# Patient Record
Sex: FEMALE
[De-identification: names, ages, dates, MRNs, and addresses within clinical notes are randomized per-mention and may not be internally consistent; named-entity substitution may affect disease eponyms.]

---

## 2018-03-29 ENCOUNTER — HOSPITAL ENCOUNTER (OUTPATIENT)
Dept: HOSPITAL 61 - CCL | Age: 58
Discharge: HOME | End: 2018-03-29
Attending: INTERNAL MEDICINE
Payer: COMMERCIAL

## 2018-03-29 DIAGNOSIS — F17.210: ICD-10-CM

## 2018-03-29 DIAGNOSIS — Z88.6: ICD-10-CM

## 2018-03-29 DIAGNOSIS — Z83.3: ICD-10-CM

## 2018-03-29 DIAGNOSIS — Z79.01: ICD-10-CM

## 2018-03-29 DIAGNOSIS — Z79.4: ICD-10-CM

## 2018-03-29 DIAGNOSIS — M79.7: ICD-10-CM

## 2018-03-29 DIAGNOSIS — Z79.899: ICD-10-CM

## 2018-03-29 DIAGNOSIS — R94.39: Primary | ICD-10-CM

## 2018-03-29 DIAGNOSIS — E78.2: ICD-10-CM

## 2018-03-29 DIAGNOSIS — E11.9: ICD-10-CM

## 2018-03-29 DIAGNOSIS — Z90.710: ICD-10-CM

## 2018-03-29 DIAGNOSIS — Z72.89: ICD-10-CM

## 2018-03-29 DIAGNOSIS — J44.9: ICD-10-CM

## 2018-03-29 DIAGNOSIS — Z98.890: ICD-10-CM

## 2018-03-29 LAB
ADD MAN DIFF?: NO
ALBUMIN SERPL-MCNC: 3.9 G/DL (ref 3.4–5)
ALBUMIN/GLOB SERPL: 1 {RATIO} (ref 1–1.7)
ALP SERPL-CCNC: 90 U/L (ref 46–116)
ALT (SGPT): 51 U/L (ref 14–59)
ANION GAP SERPL CALC-SCNC: 11 MMOL/L (ref 6–14)
AST SERPL-CCNC: 34 U/L (ref 15–37)
BASO #: 0.1 X10^3/UL (ref 0–0.2)
BASO %: 1 % (ref 0–3)
BLOOD UREA NITROGEN: 15 MG/DL (ref 7–20)
BUN/CREAT SERPL: 19 (ref 6–20)
CALCIUM: 9.8 MG/DL (ref 8.5–10.1)
CHLORIDE: 106 MMOL/L (ref 98–107)
CHOLESTEROL/HDL RATIO: 2.8
CHOLESTEROL: 124 MG/DL (ref 0–200)
CO2 SERPL-SCNC: 25 MMOL/L (ref 21–32)
CREAT SERPL-MCNC: 0.8 MG/DL (ref 0.6–1)
EOS #: 0.3 X10^3/UL (ref 0–0.7)
EOS %: 4 % (ref 0–3)
GFR SERPLBLD BASED ON 1.73 SQ M-ARVRAT: 74.2 ML/MIN
GLOBULIN SER-MCNC: 3.8 G/DL (ref 2.2–3.8)
GLUCOSE SERPL-MCNC: 110 MG/DL (ref 70–99)
HCG SERPL-ACNC: 8 X10^3/UL (ref 4–11)
HDLC: 44 MG/DL (ref 40–60)
HEMATOCRIT: 44.4 % (ref 36–47)
HEMOGLOBIN: 15.3 G/DL (ref 12–15.5)
INR: 1.1 (ref 0.8–1.1)
LDLC: 56 MG/DL (ref 0–100)
LYMPH #: 2.4 X10^3/UL (ref 1–4.8)
LYMPH %: 30 % (ref 24–48)
MAGNESIUM: 1.8 MG/DL (ref 1.8–2.4)
MEAN CORPUSCULAR HEMOGLOBIN: 32 PG (ref 25–35)
MEAN CORPUSCULAR HGB CONC: 34 G/DL (ref 31–37)
MEAN CORPUSCULAR VOLUME: 94 FL (ref 79–100)
MONO #: 0.6 X10^3/UL (ref 0–1.1)
MONO %: 7 % (ref 0–9)
NEUT #: 4.7 X10^3UL (ref 1.8–7.7)
NEUT %: 59 % (ref 31–73)
NON-HDL CHOLESTEROL: 80 MG/DL (ref 0–129)
PARTIAL THROMBOPLASTIN TIME: 29 SEC (ref 24–38)
PLATELET COUNT: 184 X10^3/UL (ref 140–400)
POTASSIUM SERPL-SCNC: 4.4 MMOL/L (ref 3.5–5.1)
PROTHROMBIN TIME PATIENT: 13.9 SEC (ref 11.7–14)
RED BLOOD COUNT: 4.74 X10^6/UL (ref 3.5–5.4)
RED CELL DISTRIBUTION WIDTH: 13.5 % (ref 11.5–14.5)
SODIUM: 142 MMOL/L (ref 136–145)
TOTAL BILIRUBIN: 0.3 MG/DL (ref 0.2–1)
TOTAL PROTEIN: 7.7 G/DL (ref 6.4–8.2)
TRIGLYCERIDES: 121 MG/DL (ref 0–150)
VLDLC: 24 MG/DL (ref 0–40)

## 2018-03-29 PROCEDURE — 99152 MOD SED SAME PHYS/QHP 5/>YRS: CPT

## 2018-03-29 PROCEDURE — 80061 LIPID PANEL: CPT

## 2018-03-29 PROCEDURE — 85730 THROMBOPLASTIN TIME PARTIAL: CPT

## 2018-03-29 PROCEDURE — 80053 COMPREHEN METABOLIC PANEL: CPT

## 2018-03-29 PROCEDURE — 85025 COMPLETE CBC W/AUTO DIFF WBC: CPT

## 2018-03-29 PROCEDURE — 93458 L HRT ARTERY/VENTRICLE ANGIO: CPT

## 2018-03-29 PROCEDURE — 99153 MOD SED SAME PHYS/QHP EA: CPT

## 2018-03-29 PROCEDURE — 36415 COLL VENOUS BLD VENIPUNCTURE: CPT

## 2018-03-29 PROCEDURE — 83735 ASSAY OF MAGNESIUM: CPT

## 2018-03-29 PROCEDURE — 85610 PROTHROMBIN TIME: CPT

## 2018-03-29 RX ADMIN — MIDAZOLAM HYDROCHLORIDE 1 MG: 1 INJECTION, SOLUTION INTRAMUSCULAR; INTRAVENOUS at 09:43

## 2018-03-29 RX ADMIN — HEPARIN SODIUM 1 UNIT: 1000 INJECTION, SOLUTION INTRAVENOUS; SUBCUTANEOUS at 09:41

## 2018-03-29 RX ADMIN — IODIXANOL 1 ML: 320 INJECTION, SOLUTION INTRAVASCULAR at 09:42

## 2018-03-29 RX ADMIN — NITROGLYCERIN 1 MCG: 5 INJECTION, SOLUTION INTRAVENOUS at 09:41

## 2018-03-29 RX ADMIN — HEPARIN SODIUM IN SODIUM CHLORIDE 1 UNIT: 200 INJECTION INTRAVENOUS at 09:41

## 2018-03-29 RX ADMIN — LIDOCAINE HYDROCHLORIDE 1 ML: 20 INJECTION, SOLUTION INFILTRATION; PERINEURAL at 09:45

## 2018-03-29 RX ADMIN — FENTANYL CITRATE 1 MCG: 50 INJECTION INTRAMUSCULAR; INTRAVENOUS at 09:43

## 2018-03-29 RX ADMIN — VERAPAMIL HYDROCHLORIDE 1 MG: 2.5 INJECTION, SOLUTION INTRAVENOUS at 09:42

## 2019-05-14 ENCOUNTER — HOSPITAL ENCOUNTER (EMERGENCY)
Dept: HOSPITAL 63 - ER | Age: 59
Discharge: HOME | End: 2019-05-14
Payer: COMMERCIAL

## 2019-05-14 VITALS — DIASTOLIC BLOOD PRESSURE: 76 MMHG | SYSTOLIC BLOOD PRESSURE: 137 MMHG

## 2019-05-14 DIAGNOSIS — J44.9: ICD-10-CM

## 2019-05-14 DIAGNOSIS — M11.261: ICD-10-CM
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
## 2019-05-14 DIAGNOSIS — M79.7: ICD-10-CM

## 2019-05-14 DIAGNOSIS — I10: ICD-10-CM

## 2019-05-14 DIAGNOSIS — E78.00: ICD-10-CM
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
## 2019-05-14 DIAGNOSIS — M25.561: Primary | ICD-10-CM

## 2019-05-14 DIAGNOSIS — F17.210: ICD-10-CM
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
## 2019-05-14 PROCEDURE — 99283 EMERGENCY DEPT VISIT LOW MDM: CPT

## 2019-05-14 PROCEDURE — 99284 EMERGENCY DEPT VISIT MOD MDM: CPT

## 2019-05-14 PROCEDURE — 73562 X-RAY EXAM OF KNEE 3: CPT

## 2019-05-14 PROCEDURE — 29505 APPLICATION LONG LEG SPLINT: CPT

## 2019-05-14 NOTE — RAD
Three-view right knee dated 5/14/2019.

 

No comparison available.

 

CLINICAL INDICATION: Medial knee pain rating down right leg.

 

FINDINGS: 

3 views of the right knee show normal bony alignment. No displaced 

fracture. Mild chondrocalcinosis. No apparent joint effusion or loose 

body.

 

IMPRESSION:

1. No acute radiographic abnormality.

2. Chondrocalcinosis.

 

Electronically signed by: Dany Godwin MD (5/14/2019 10:16 AM) 

[facility]

## 2019-05-14 NOTE — PHYS DOC
Past History


Past Medical History:  COPD, Fibromyalgia, High Cholesterol, Hypertension


Past Surgical History:  , Hysterectomy, Tonsillectomy, Other


Additional Past Surgical Histo:  breast reduction


Smoking:  Cigarettes


Drug Use:  None





Adult General


Chief Complaint


Chief Complaint:  KNEE INJURY





HPI


HPI





Patient is a 58-year-old female presents complaining of right knee pain. Patient

was cutting down a tree when she twisted her ankle and fell on her knee. She has

been able to walk on it. This happened shortly prior to arrival. Reports the 

pain is growing worse. Denies any ankle pain. Denies any numbness, tingling or 

paresthesias. She is taken no medicine for the pain. Nothing seems to make the 

discomfort better. Movement and walking makes it worse. Pain is moderate in 

intensity. No radiation of the pain.[]





Review of Systems


Review of Systems





Constitutional: Denies fever or chills []


Eyes: Denies change in visual acuity, redness, or eye pain []


HENT: Denies nasal congestion or sore throat []


Respiratory: Denies cough or shortness of breath []


Cardiovascular: No chest pain or palpitations[]


GI: Denies abdominal pain, nausea, vomiting, bloody stools or diarrhea []


: Denies dysuria or hematuria []


Musculoskeletal: Denies back pain see history of present illness[]


Integument: Denies rash or skin lesions []


Neurologic: Denies headache, focal weakness or sensory changes []


Endocrine: Denies polyuria or polydipsia []





All other systems were reviewed and found to be within normal limits, except as 

documented in this note.





Physical Exam


Physical Exam





Constitutional: Well developed, well nourished, mild discomfort, non-toxic 

appearance. []


HENT: Normocephalic, atraumatic, bilateral external ears normal, oropharynx 

moist, no oral exudates, nose normal. []


Eyes: PERRLA, EOMI, conjunctiva normal, no discharge. [] 


Neck: Normal range of motion, no tenderness, supple, no stridor. [] 


Cardiovascular:Heart rate regular rhythm, no murmur []


Lungs & Thorax:  Bilateral breath sounds clear to auscultation []


Abdomen: Bowel sounds normal, soft, no tenderness, no masses, no pulsatile 

masses. [] 


Skin: Warm, dry, no erythema, no rash. [] 


Back: No tenderness, no CVA tenderness. [] 


Extremities: Right knee shows no erythema, no edema, no effusion, no ecchymosis.

 Patient has full active range of motion. It is diffusely tender about the right

 knee. There is no anterior or posterior drawer, no varus or valgus laxity, 

negative Lachman test. A joint above and joined below were evaluated and were 

normal. Patient was distally neurovascularly intact. The other 3 extremities 

show: No tenderness, no cyanosis, no clubbing, ROM intact, no edema. [] 


Neurologic: Alert and oriented X 3, normal motor function, normal sensory 

function, no focal deficits noted. []


Psychologic: Affect normal, judgement normal, mood normal. []





EKG


EKG


[]





Radiology/Procedures


Radiology/Procedures


PROCEDURE: KNEE RIGHT 3V





Three-view right knee dated 2019.


 


No comparison available.


 


CLINICAL INDICATION: Medial knee pain rating down right leg.


 


FINDINGS: 


3 views of the right knee show normal bony alignment. No displaced 


fracture. Mild chondrocalcinosis. No apparent joint effusion or loose 


body.


 


IMPRESSION:


1. No acute radiographic abnormality.


2. Chondrocalcinosis.[]





Course & Med Decision Making


Course & Med Decision Making


Pertinent Labs and Imaging studies reviewed. (See chart for details)





ED course: Patient arrived, was placed in bed, and tolerated exam well. She was 

transported to and from radiology with any complications. After return the 

imaging findings, these were discussed with the patient and her  who 

voiced understanding. Patient had knee immobilizer placed. She was distally 

neurovascularly intact after the knee immobilizer was placed. Patient was 

discharged in improved condition.





Medical decision making: There is no evidence of a fracture or dislocation. No 

evidence of cruciate ligament or collateral ligament significant injury. No 

evidence of neurologic or vascular compromise. Patient has narcotic pain 

medicine at home which I believe will work to manage her pain. She is reportedly

 unable to tolerate NSAIDs due to GI upset.[]





Dragon Disclaimer


Dragon Disclaimer


This electronic medical record was generated, in whole or in part, using a voice

 recognition dictation system.





Departure


Departure:


Impression:  


   Primary Impression:  


   Right knee pain


Disposition:   HOME, SELF-CARE


Condition:  IMPROVED


Referrals:  


B[facility] (PCP)


Follow-up in 2 days


Patient Instructions:  Crutch Use, Knee Immobilizer-Brief, Knee Sprain





Additional Instructions:  


Follow-up with your regular doctor in 2 days. Apply ice to the knee for 15 

minutes at a time, at least 4 times a day for the next 3 days. Continue your 

home pain medication regimen as prescribed. Return to the ER if worsening pain 

or any other concerns.





Problem Qualifiers








   Primary Impression:  


   Right knee pain


   Chronicity:  acute  Qualified Codes:  M25.561 - Pain in right knee








YORDAN ARIAS DO          May 14, 2019 09:58

## 2022-02-23 ENCOUNTER — HOSPITAL ENCOUNTER (EMERGENCY)
Dept: HOSPITAL 63 - ER | Age: 62
Discharge: HOME | End: 2022-02-23
Payer: COMMERCIAL

## 2022-02-23 VITALS — WEIGHT: 190.04 LBS | BODY MASS INDEX: 35.88 KG/M2 | HEIGHT: 61 IN

## 2022-02-23 VITALS — DIASTOLIC BLOOD PRESSURE: 64 MMHG | SYSTOLIC BLOOD PRESSURE: 128 MMHG

## 2022-02-23 DIAGNOSIS — R11.2: ICD-10-CM

## 2022-02-23 DIAGNOSIS — I10: ICD-10-CM

## 2022-02-23 DIAGNOSIS — M79.7: ICD-10-CM

## 2022-02-23 DIAGNOSIS — Z88.8: ICD-10-CM

## 2022-02-23 DIAGNOSIS — J44.9: ICD-10-CM

## 2022-02-23 DIAGNOSIS — F10.129: Primary | ICD-10-CM

## 2022-02-23 DIAGNOSIS — F17.210: ICD-10-CM

## 2022-02-23 DIAGNOSIS — E78.00: ICD-10-CM
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
## 2022-02-23 PROCEDURE — 96374 THER/PROPH/DIAG INJ IV PUSH: CPT

## 2022-02-23 PROCEDURE — 96375 TX/PRO/DX INJ NEW DRUG ADDON: CPT

## 2022-02-23 PROCEDURE — 70450 CT HEAD/BRAIN W/O DYE: CPT

## 2022-02-23 PROCEDURE — 96361 HYDRATE IV INFUSION ADD-ON: CPT

## 2022-02-23 PROCEDURE — 99284 EMERGENCY DEPT VISIT MOD MDM: CPT

## 2022-02-23 NOTE — RAD
Exam Date:  2/23/2022 8:10 PM



CT HEAD/BRAIN WO



Indication: Reason: ETOH, fall / Spl. Instructions:  / History: .



TECHNIQUE:  Head CT was performed without intravenous contrast.  One or more of the following dose re
duction techniques were utilized:

*Automated exposure control (AEC)

*Adjustment of mA and/or kV according to patient size

*Use of iterative reconstruction technique

*CT scan done according to ALARA, or ALARA/IMAGE GENTLY



FINDINGS: 



The ventricles and sulci are normal for the patient's stated age.   There is no evidence of acute int
racranial hemorrhage, extra-axial collection, mass effect, midline shift, or acute territorial infarc
t. No lesion of the skull base or the calvarium is seen. The visualized mastoid air cells and orbits 
are normal in appearance.  Mucosal thickening is seen in the paranasal sinuses.



IMPRESSION: 



No evidence for acute intracranial abnormality.  







Electronically signed by: Jono Brandt MD (2/23/2022 8:32 PM) [facility]

## 2022-02-23 NOTE — PHYS DOC
Past History


Past Medical History:  COPD, Fibromyalgia, High Cholesterol, Hypertension


Past Surgical History:  , Hysterectomy, Tonsillectomy, Other


Additional Past Surgical Histo:  breast reduction


Smoking:  Cigarettes


Alcohol Use:  None


Drug Use:  None





Adult General


HPI


HPI


Patient is a 61-year-old female that was brought in from the bar intoxicated.  

States that she had several [place] ice teas and a couple of shots and then 

fell asleep/passed out at the bar and had 2 episodes of vomiting.  States that 

the people at the bar called EMS and had her brought to the emergency 

department.  Patient denies any headache, head injuries, changes in vision, neck

pain, chest pain, shortness of breath, abdominal pain, diarrhea.  Denies recent 

travel, traumas.  Denies any numbness/weakness/tingling.





Review of Systems


Review of Systems


Review of systems otherwise unremarkable except noted in HPI





Allergies


Allergies





Allergies








Coded Allergies Type Severity Reaction Last Updated Verified


 


  acetaminophen Adverse Reaction Unknown  19 Yes


 


  ibuprofen Adverse Reaction Unknown  19 Yes


 


  meperidine Adverse Reaction Unknown  19 Yes











Physical Exam


Physical Exam





Constitutional: Well developed, well nourished, no acute distress, non-toxic 

appearance. []


HENT: Normocephalic, atraumatic, bilateral external ears normal, oropharynx 

moist, no oral exudates, nose normal. []


Eyes: conjunctiva normal, no discharge. [] 


Neck: Normal range of motion, no tenderness, supple, no stridor. [] 


Cardiovascular:Heart rate regular rhythm, no murmur []


Lungs & Thorax:  Bilateral breath sounds clear to auscultation []


Abdomen:  soft, no tenderness, no masses, no pulsatile masses. [] 


Skin: Warm, dry, no erythema, no rash. [] 


Back: No tenderness, no CVA tenderness. [] 


Extremities: No tenderness, no cyanosis, no clubbing, ROM intact, no edema. [] 


Neurologic: Appears intoxicated, however is alert and oriented X 3, normal motor

 function, normal sensory function, able to sit, stand and walk, no focal 

deficits noted. []


Psychologic: Affect normal, judgement normal, mood normal. []





EKG


EKG


[]





Radiology/Procedures


Radiology/Procedures


[]





Heart Score


C/O Chest Pain:  No


Risk Factors:


Risk Factors:  DM, Current or recent (<one month) smoker, HTN, HLP, family 

history of CAD, obesity.


Risk Scores:


Risk Factors:  DM, Current or recent (<one month) smoker, HTN, HLP, family 

history of CAD, obesity.





Course & Med Decision Making


Course & Med Decision Making


Patient is a 61-year-old female who presents after getting drunk at a bar, 

passing out and throwing up


Vital signs not concerning.  Physical exam noted above.  Fluid resuscitated.  

Given antiemetics.


On reassessment, patient awake alert and oriented, up walking around her room 

and walking to the bathroom.   here wanting to take her home.


Family wanted to be discharged.  Discussed the risks of alcohol intoxication.  

Advised to follow-up in the morning with primary care physician.


Gave strict return cautions to the ED.  Patient and family grateful, verbalized 

understanding and agreed with plan of discharge.





[]





Dragon Disclaimer


Dragon Disclaimer


This electronic medical record was generated, in whole or in part, using a voice

 recognition dictation system.





Departure


Departure:


Impression:  


   Primary Impression:  


   Alcohol intoxication


   Additional Impression:  


   Nausea & vomiting


Disposition:  01 HOME / SELF CARE / HOMELESS


Condition:  GOOD


Referrals:  


B[facility] (PCP)


Patient Instructions:  Alcohol Intoxication





Additional Instructions:  


Thank you for coming into the emergency department tonight allowing us to take 

care of you.  Please read the attached information carefully to go over things 

we discussed.  Please cease drinking alcohol as this poses serious health risks 

especially if you drink too much, you could fall and hurt yourself, bleed in 

your brain, break bones, cause disability and death in the worst case scenario. 

 Please follow-up in the morning with your primary care physician update on your

 ED visit and set up a follow-up.  Please come back with new or concerning 

symptoms as discussed.





Problem Qualifiers











JETHRO SCHULTE MD               2022 19:43